# Patient Record
Sex: MALE | Race: WHITE | URBAN - METROPOLITAN AREA
[De-identification: names, ages, dates, MRNs, and addresses within clinical notes are randomized per-mention and may not be internally consistent; named-entity substitution may affect disease eponyms.]

---

## 2022-03-18 PROBLEM — M79.89 LEFT LEG SWELLING: Status: ACTIVE | Noted: 2022-01-31

## 2022-03-19 PROBLEM — E78.00 PURE HYPERCHOLESTEROLEMIA: Status: ACTIVE | Noted: 2022-01-31

## 2022-03-20 PROBLEM — I10 PRIMARY HYPERTENSION: Status: ACTIVE | Noted: 2022-01-31

## 2022-05-04 ENCOUNTER — APPOINTMENT (RX ONLY)
Dept: URBAN - METROPOLITAN AREA CLINIC 23 | Facility: CLINIC | Age: 52
Setting detail: DERMATOLOGY
End: 2022-05-04

## 2022-05-04 DIAGNOSIS — L55.9 SUNBURN, UNSPECIFIED: ICD-10-CM

## 2022-05-04 DIAGNOSIS — L30.9 DERMATITIS, UNSPECIFIED: ICD-10-CM

## 2022-05-04 PROCEDURE — ? BIOPSY BY SHAVE METHOD

## 2022-05-04 PROCEDURE — 99202 OFFICE O/P NEW SF 15 MIN: CPT | Mod: 25

## 2022-05-04 PROCEDURE — 11102 TANGNTL BX SKIN SINGLE LES: CPT

## 2022-05-04 PROCEDURE — ? COUNSELING

## 2022-05-04 ASSESSMENT — LOCATION SIMPLE DESCRIPTION DERM
LOCATION SIMPLE: RIGHT PRETIBIAL REGION
LOCATION SIMPLE: LEFT THIGH
LOCATION SIMPLE: LEFT PRETIBIAL REGION

## 2022-05-04 ASSESSMENT — LOCATION DETAILED DESCRIPTION DERM
LOCATION DETAILED: LEFT ANTERIOR DISTAL THIGH
LOCATION DETAILED: LEFT DISTAL PRETIBIAL REGION
LOCATION DETAILED: RIGHT PROXIMAL PRETIBIAL REGION
LOCATION DETAILED: RIGHT DISTAL PRETIBIAL REGION
LOCATION DETAILED: RIGHT LATERAL PROXIMAL PRETIBIAL REGION

## 2022-05-04 ASSESSMENT — LOCATION ZONE DERM: LOCATION ZONE: LEG

## 2022-05-04 NOTE — PROCEDURE: BIOPSY BY SHAVE METHOD
Hide Biopsy Depth?: No
Biopsy Method: double edge Personna blade
Electrodesiccation Text: The wound bed was treated with electrodesiccation after the biopsy was performed.
Consent: Verbal consent was obtained and risks were reviewed including but not limited to scarring, infection, bleeding, scabbing, incomplete removal, and allergy to anesthesia. AFSHIN Graves.
Was A Bandage Applied: Yes
Type Of Destruction Used: Curettage
Anesthesia Type: 1% lidocaine without epinephrine and a 1:10 solution of 8.4% sodium bicarbonate
Additional Anesthesia Volume In Cc (Will Not Render If 0): 0
Wound Care: Vaseline
Billing Type: Third-Party Bill
Electrodesiccation And Curettage Text: The wound bed was treated with electrodesiccation and curettage after the biopsy was performed.
Detail Level: Detailed
Biopsy Type: H and E
Silver Nitrate Text: The wound bed was treated with silver nitrate after the biopsy was performed.
Dressing: bandage
Information: Selecting Yes will display possible errors in your note based on the variables you have selected. This validation is only offered as a suggestion for you. PLEASE NOTE THAT THE VALIDATION TEXT WILL BE REMOVED WHEN YOU FINALIZE YOUR NOTE. IF YOU WANT TO FAX A PRELIMINARY NOTE YOU WILL NEED TO TOGGLE THIS TO 'NO' IF YOU DO NOT WANT IT IN YOUR FAXED NOTE.
Notification Instructions: Patient will be notified of biopsy results. However, patient instructed to call the office if not contacted within 2 weeks.
Accession #: [id number]
Cryotherapy Text: The wound bed was treated with cryotherapy after the biopsy was performed.
Hemostasis: Aluminum Chloride
Post-care instructions were reviewed in detail and written instructions are provided. Patient is to keep the biopsy site dry overnight, and then apply bacitracin twice daily until healed. Patient may apply hydrogen peroxide soaks to remove any crusting.
Anesthesia Volume In Cc: 0.5
Depth Of Biopsy: dermis

## 2022-05-13 ENCOUNTER — RX ONLY (OUTPATIENT)
Age: 52
Setting detail: RX ONLY
End: 2022-05-13

## 2022-05-13 RX ORDER — CLOBETASOL PROPIONATE 0.5 MG/G
CREAM TOPICAL
Qty: 45 | Refills: 1 | Status: ERX | COMMUNITY
Start: 2022-05-13

## 2022-07-20 ENCOUNTER — OFFICE VISIT (OUTPATIENT)
Dept: CARDIOLOGY CLINIC | Age: 52
End: 2022-07-20
Payer: COMMERCIAL

## 2022-07-20 VITALS
SYSTOLIC BLOOD PRESSURE: 120 MMHG | BODY MASS INDEX: 38.31 KG/M2 | HEIGHT: 72 IN | WEIGHT: 282.8 LBS | HEART RATE: 74 BPM | DIASTOLIC BLOOD PRESSURE: 70 MMHG

## 2022-07-20 DIAGNOSIS — M79.89 LEFT LEG SWELLING: ICD-10-CM

## 2022-07-20 DIAGNOSIS — I10 PRIMARY HYPERTENSION: Primary | ICD-10-CM

## 2022-07-20 DIAGNOSIS — E78.00 PURE HYPERCHOLESTEROLEMIA: ICD-10-CM

## 2022-07-20 PROCEDURE — 99214 OFFICE O/P EST MOD 30 MIN: CPT | Performed by: INTERNAL MEDICINE

## 2022-07-20 NOTE — PROGRESS NOTES
[address], [address] [employer] [address]  PHONE: [phone]     22    NAME:  Isa Conn  : 1970  MRN: [id number]       SUBJECTIVE:   Isa Conn is a 47 y.o. male seen for a follow up visit regarding the following:     Chief Complaint   Patient presents with    Hypertension     4 mo f/u        HPI:    Echo 3/2022:  Normal EF, no pHTN, normal RV    Edema better off the nifedipine, on aldactone and HCTZ. Better now. Feeling well now. No CP, pressure, THOMPSON. No new SOB. Patient denies recent history of orthopnea, PND, excessive dizziness and/or syncope. [facility] 2022: US showed No evidence of DVT within the left lower extremity      Past Medical History, Past Surgical History, Family history, Social History, and Medications were all reviewed with the patient today and updated as necessary. Current Outpatient Medications   Medication Sig Dispense Refill    hydroCHLOROthiazide (HYDRODIURIL) 25 MG tablet Take 25 mg by mouth daily      levothyroxine (SYNTHROID) 25 MCG tablet Take 25 mcg by mouth daily      pravastatin (PRAVACHOL) 20 MG tablet Take 20 mg by mouth daily      spironolactone (ALDACTONE) 25 MG tablet Take 25 mg by mouth daily      tadalafil (CIALIS) 5 MG tablet Take 5 mg by mouth as needed      tamsulosin (FLOMAX) 0.4 MG capsule Take 0.4 mg by mouth daily       No current facility-administered medications for this visit. No Known Allergies  Patient Active Problem List    Diagnosis Date Noted    Left leg swelling 2022    Pure hypercholesterolemia 2022    Primary hypertension 2022      No past surgical history on file.   Family History   Problem Relation Age of Onset    Stroke Paternal Grandfather     Stroke Paternal Grandmother     No Known Problems Father     No Known Problems Mother      Social History     Tobacco Use    Smoking status: Never    Smokeless tobacco: Current   Substance Use Topics    Alcohol use: Yes         ROS:    No obvious pertinent positives on review of systems except for what was outlined in the HPI today. PHYSICAL EXAM:     /70 Comment: home re-check  Pulse 74   Ht 6' (1.829 m)   Wt 282 lb 12.8 oz (128.3 kg)   BMI 38.35 kg/m²    General/Constitutional:   Alert and oriented x 3, no acute distress  HEENT:   normocephalic, atraumatic, moist mucous membranes  Neck:   No JVD or carotid bruits bilaterally  Cardiovascular:   regular rate and rhythm, no murmur/rub/gallop appreciated  Pulmonary:   clear to auscultation bilaterally, no respiratory distress  Abdomen:   soft, non-tender, non-distended  Ext:   No sig LE edema bilaterally  Skin:  warm and dry, no obvious rashes seen  Neuro:   no obvious sensory or motor deficits  Psychiatric:   normal mood and affect      Lab Results   Component Value Date/Time     03/24/2022 09:31 AM    K 3.8 03/24/2022 09:31 AM     03/24/2022 09:31 AM    CO2 23 03/24/2022 09:31 AM    BUN 10 03/24/2022 09:31 AM    CREATININE 1.10 03/24/2022 09:31 AM    GLUCOSE 118 03/24/2022 09:31 AM    CALCIUM 9.6 03/24/2022 09:31 AM        No results found for: WBC, HGB, HCT, MCV, PLT    Lab Results   Component Value Date    TSH 2.630 03/24/2022       No results found for: LABA1C  No results found for: EAG    No results found for: CHOL  No results found for: TRIG  No results found for: HDL  No results found for: LDLCHOLESTEROL, LDLCALC  No results found for: LABVLDL, VLDL  No results found for: CHOLHDLRATIO        I have Independently reviewed prior care notes, any ER records available, cardiac testing, labs and results with the patient and before seeing the patient today. Also independently reviewed outside records when available. ASSESSMENT:    Jong Pillai was seen today for hypertension. Diagnoses and all orders for this visit:    Primary hypertension    Left leg swelling    Pure hypercholesterolemia        PLAN:    1. LE edema/HTN:  No DVT on US. Echo ok.   Stopped the nifedipine, NOW on aldactone with HCTZ. Follow K and Cr. Getting labs next week. Edema better. BP better at home now. Low salt diet needed. Needs to elevate legs. 2. HPL:  follow, getting labs. Patient was encouraged to engage in regular moderate physical activity for at least 30-45 minutes at least 4-5 days of the week. We also reviewed heart healthy diets such as a whole foods plant based diet and a mediterranean diet rich in whole grains, fruits and vegetables. The importance of a healthy lifestyle was reviewed as well. The patient voiced understanding. The patient has been instructed to call with any angina or equivalent as reviewed today. All questions were answered with the patient voicing complete understanding. Patient has been instructed and agrees to call our office with any issues or other concerns related to their cardiac condition(s) and/or complaint(s). Return in about 6 months (around 1/20/2023).        MARYSOL FARNSWORTH, DO  7/20/2022

## 2023-01-19 RX ORDER — HYDROCHLOROTHIAZIDE 25 MG/1
25 TABLET ORAL DAILY
Qty: 90 TABLET | Refills: 3 | Status: SHIPPED | OUTPATIENT
Start: 2023-01-19

## 2023-01-19 NOTE — TELEPHONE ENCOUNTER
MEDICATION REFILL REQUEST      Name of Medication:  Hydrochlorothiazide   Dose:  25 mg  Frequency:    Quantity:    Days' supply:  90      [facility]Location[facility]

## 2023-01-25 ENCOUNTER — RX ONLY (OUTPATIENT)
Age: 53
Setting detail: RX ONLY
End: 2023-01-25

## 2023-01-25 RX ORDER — TRIAMCINOLONE ACETONIDE 1 MG/G
CREAM TOPICAL
Qty: 80 | Refills: 0 | Status: ERX | COMMUNITY
Start: 2023-01-25

## 2023-03-22 NOTE — TELEPHONE ENCOUNTER
MEDICATION REFILL REQUEST      Name of Medication:  Spironolactone  Dose:  25 mg  Frequency:  QD  Quantity:  ?  Days' supply:  ?       Ph[facility] Name/Location:  [facility] [phone]

## 2023-03-23 RX ORDER — SPIRONOLACTONE 25 MG/1
25 TABLET ORAL DAILY
Qty: 90 TABLET | Refills: 1 | Status: SHIPPED | OUTPATIENT
Start: 2023-03-23

## 2023-09-21 ENCOUNTER — TELEPHONE (OUTPATIENT)
Age: 53
End: 2023-09-21

## 2023-09-21 NOTE — TELEPHONE ENCOUNTER
MEDICATION REFILL REQUEST      Name of Medication:  Spironolactone   Dose:  25 mg  Frequency:  daily   Quantity:    Days' supply:  90      Pharm[facility] Name/Location:  [facility]

## 2023-09-22 RX ORDER — SPIRONOLACTONE 25 MG/1
25 TABLET ORAL DAILY
Qty: 30 TABLET | Refills: 0 | Status: SHIPPED | OUTPATIENT
Start: 2023-09-22

## 2023-09-22 NOTE — TELEPHONE ENCOUNTER
Requested Prescriptions     Signed Prescriptions Disp Refills    spironolactone (ALDACTONE) 25 MG tablet 30 tablet 0     Sig: Take 1 tablet by mouth daily     Authorizing Provider: Bautista Esquivel     Ordering User: Bryson Wallace

## 2023-10-23 RX ORDER — SPIRONOLACTONE 25 MG/1
25 TABLET ORAL DAILY
Qty: 30 TABLET | Refills: 0 | Status: SHIPPED | OUTPATIENT
Start: 2023-10-23

## 2023-10-23 NOTE — TELEPHONE ENCOUNTER
MEDICATION REFILL REQUEST      Name of Medication:  Spironolactone  Dose:  25 mg  Frequency:  1 a day  Quantity:  ?  Days' supply:  ?       Pharmacy Name/Location:  [facility] [phone]

## 2023-10-30 ENCOUNTER — OFFICE VISIT (OUTPATIENT)
Age: 53
End: 2023-10-30
Payer: COMMERCIAL

## 2023-10-30 VITALS
SYSTOLIC BLOOD PRESSURE: 156 MMHG | DIASTOLIC BLOOD PRESSURE: 100 MMHG | HEART RATE: 74 BPM | HEIGHT: 72 IN | WEIGHT: 292.6 LBS | BODY MASS INDEX: 39.63 KG/M2

## 2023-10-30 DIAGNOSIS — E78.00 PURE HYPERCHOLESTEROLEMIA: ICD-10-CM

## 2023-10-30 DIAGNOSIS — I10 PRIMARY HYPERTENSION: Primary | ICD-10-CM

## 2023-10-30 DIAGNOSIS — M25.60 JOINT STIFFNESS: ICD-10-CM

## 2023-10-30 PROCEDURE — 99214 OFFICE O/P EST MOD 30 MIN: CPT | Performed by: INTERNAL MEDICINE

## 2023-10-30 PROCEDURE — 3080F DIAST BP >= 90 MM HG: CPT | Performed by: INTERNAL MEDICINE

## 2023-10-30 PROCEDURE — 3077F SYST BP >= 140 MM HG: CPT | Performed by: INTERNAL MEDICINE

## 2023-10-30 PROCEDURE — 93000 ELECTROCARDIOGRAM COMPLETE: CPT | Performed by: INTERNAL MEDICINE

## 2023-10-30 RX ORDER — METOPROLOL TARTRATE 50 MG/1
50 TABLET, FILM COATED ORAL 2 TIMES DAILY
COMMUNITY
Start: 2023-04-28

## 2023-10-30 RX ORDER — LOSARTAN POTASSIUM 100 MG/1
100 TABLET ORAL DAILY
Qty: 90 TABLET | Refills: 3 | Status: SHIPPED | OUTPATIENT
Start: 2023-10-30

## 2023-11-20 DIAGNOSIS — E78.00 PURE HYPERCHOLESTEROLEMIA: ICD-10-CM

## 2023-11-20 DIAGNOSIS — M25.60 JOINT STIFFNESS: ICD-10-CM

## 2023-11-20 DIAGNOSIS — I10 PRIMARY HYPERTENSION: ICD-10-CM

## 2023-11-20 LAB
ALBUMIN SERPL-MCNC: 3.7 G/DL (ref 3.5–5)
ALBUMIN/GLOB SERPL: 1.1 (ref 0.4–1.6)
ALP SERPL-CCNC: 63 U/L (ref 50–136)
ALT SERPL-CCNC: 40 U/L (ref 12–65)
ANION GAP SERPL CALC-SCNC: 8 MMOL/L (ref 2–11)
AST SERPL-CCNC: 24 U/L (ref 15–37)
BILIRUB SERPL-MCNC: 0.4 MG/DL (ref 0.2–1.1)
BUN SERPL-MCNC: 13 MG/DL (ref 6–23)
CALCIUM SERPL-MCNC: 8.9 MG/DL (ref 8.3–10.4)
CHLORIDE SERPL-SCNC: 109 MMOL/L (ref 101–110)
CHOLEST SERPL-MCNC: 199 MG/DL
CO2 SERPL-SCNC: 22 MMOL/L (ref 21–32)
CREAT SERPL-MCNC: 1.1 MG/DL (ref 0.8–1.5)
ERYTHROCYTE [DISTWIDTH] IN BLOOD BY AUTOMATED COUNT: 13.2 % (ref 11.9–14.6)
GLOBULIN SER CALC-MCNC: 3.5 G/DL (ref 2.8–4.5)
GLUCOSE SERPL-MCNC: 126 MG/DL (ref 65–100)
HCT VFR BLD AUTO: 49.3 % (ref 41.1–50.3)
HDLC SERPL-MCNC: 65 MG/DL (ref 40–60)
HDLC SERPL: 3.1
HGB BLD-MCNC: 16.5 G/DL (ref 13.6–17.2)
LDLC SERPL CALC-MCNC: 103.2 MG/DL
MAGNESIUM SERPL-MCNC: 2.2 MG/DL (ref 1.8–2.4)
MCH RBC QN AUTO: 32.6 PG (ref 26.1–32.9)
MCHC RBC AUTO-ENTMCNC: 33.5 G/DL (ref 31.4–35)
MCV RBC AUTO: 97.4 FL (ref 82–102)
NRBC # BLD: 0 K/UL (ref 0–0.2)
PLATELET # BLD AUTO: 204 K/UL (ref 150–450)
PMV BLD AUTO: 10 FL (ref 9.4–12.3)
POTASSIUM SERPL-SCNC: 4.7 MMOL/L (ref 3.5–5.1)
PROT SERPL-MCNC: 7.2 G/DL (ref 6.3–8.2)
PSA SERPL-MCNC: 0.7 NG/ML
RBC # BLD AUTO: 5.06 M/UL (ref 4.23–5.6)
SODIUM SERPL-SCNC: 139 MMOL/L (ref 133–143)
TRIGL SERPL-MCNC: 154 MG/DL (ref 35–150)
TSH, 3RD GENERATION: 3.74 UIU/ML (ref 0.36–3.74)
VLDLC SERPL CALC-MCNC: 30.8 MG/DL (ref 6–23)
WBC # BLD AUTO: 6.6 K/UL (ref 4.3–11.1)

## 2023-11-21 LAB
ANA SER QL: NEGATIVE
EST. AVERAGE GLUCOSE BLD GHB EST-MCNC: 117 MG/DL
HBA1C MFR BLD: 5.7 % (ref 4.8–5.6)
RHEUMATOID FACT SER QL LA: NEGATIVE

## 2023-11-22 RX ORDER — SPIRONOLACTONE 25 MG/1
25 TABLET ORAL DAILY
Qty: 30 TABLET | Refills: 11 | Status: SHIPPED | OUTPATIENT
Start: 2023-11-22

## 2023-11-22 NOTE — TELEPHONE ENCOUNTER
MEDICATION REFILL REQUEST      Name of Medication:  Spironolactone  Dose:  25 mg  Frequency:  QD  Quantity:  30  Days' supply:  30 with refills      Pharmacy Name/Location: [facility]  Pt is out of records

## 2023-12-07 ENCOUNTER — OFFICE VISIT (OUTPATIENT)
Age: 53
End: 2023-12-07
Payer: COMMERCIAL

## 2023-12-07 VITALS
WEIGHT: 292 LBS | BODY MASS INDEX: 39.55 KG/M2 | SYSTOLIC BLOOD PRESSURE: 128 MMHG | HEIGHT: 72 IN | DIASTOLIC BLOOD PRESSURE: 86 MMHG | HEART RATE: 68 BPM

## 2023-12-07 DIAGNOSIS — I10 PRIMARY HYPERTENSION: ICD-10-CM

## 2023-12-07 DIAGNOSIS — E78.00 PURE HYPERCHOLESTEROLEMIA: Primary | ICD-10-CM

## 2023-12-07 DIAGNOSIS — M79.89 LEFT LEG SWELLING: ICD-10-CM

## 2023-12-07 PROCEDURE — 3079F DIAST BP 80-89 MM HG: CPT | Performed by: INTERNAL MEDICINE

## 2023-12-07 PROCEDURE — 99214 OFFICE O/P EST MOD 30 MIN: CPT | Performed by: INTERNAL MEDICINE

## 2023-12-07 PROCEDURE — 3074F SYST BP LT 130 MM HG: CPT | Performed by: INTERNAL MEDICINE

## 2024-10-29 RX ORDER — LOSARTAN POTASSIUM 100 MG/1
100 TABLET ORAL DAILY
Qty: 90 TABLET | Refills: 0 | Status: SHIPPED | OUTPATIENT
Start: 2024-10-29

## 2024-10-29 NOTE — TELEPHONE ENCOUNTER
MEDICATION REFILL REQUEST      Name of Medication:  Losartan  Dose:  100 mg  Frequency:  daily   Quantity:    Days' supply:  90 day       Pharm[facility] Name/Location:  [facility] [phone]

## 2024-11-13 RX ORDER — SPIRONOLACTONE 25 MG/1
25 TABLET ORAL DAILY
Qty: 90 TABLET | Refills: 0 | Status: SHIPPED | OUTPATIENT
Start: 2024-11-13

## 2024-11-13 NOTE — TELEPHONE ENCOUNTER
MEDICATION REFILL REQUEST      Name of Medication:  Spironolactone  Dose:  25 mg  Frequency:  QD  Quantity:  90  Days' supply:  90 with 3 refills      Pharmacy Name/Location:  [facility] [phone]

## 2024-12-17 ENCOUNTER — TELEPHONE (OUTPATIENT)
Age: 54
End: 2024-12-17

## 2024-12-17 NOTE — TELEPHONE ENCOUNTER
MEDICATION REFILL REQUEST      Name of Medication:  Spironolactone  Dose:  25 mg  Frequency:  QD  Quantity:  90  Days' supply:  90 with 3 refill      Pharmacy Name/Location:  [facility] [phone]

## 2024-12-17 NOTE — TELEPHONE ENCOUNTER
Called pt advised Rx was sent in a month ago for 3 months. Pt states should have enough but was needing refills. Advised would need to be seen in office. Scheduled for 2/14/25.

## 2025-01-28 RX ORDER — LOSARTAN POTASSIUM 100 MG/1
100 TABLET ORAL DAILY
Qty: 30 TABLET | Refills: 0 | Status: SHIPPED | OUTPATIENT
Start: 2025-01-28

## 2025-01-28 NOTE — TELEPHONE ENCOUNTER
Losartan refill HE IS OUT   [facility] in [place]     Coming in 02/14 to see Dr Solis

## 2025-02-14 ENCOUNTER — OFFICE VISIT (OUTPATIENT)
Age: 55
End: 2025-02-14
Payer: COMMERCIAL

## 2025-02-14 VITALS
SYSTOLIC BLOOD PRESSURE: 130 MMHG | DIASTOLIC BLOOD PRESSURE: 86 MMHG | HEART RATE: 74 BPM | BODY MASS INDEX: 42.39 KG/M2 | HEIGHT: 72 IN | WEIGHT: 313 LBS

## 2025-02-14 DIAGNOSIS — I10 PRIMARY HYPERTENSION: Primary | ICD-10-CM

## 2025-02-14 DIAGNOSIS — E78.00 PURE HYPERCHOLESTEROLEMIA: ICD-10-CM

## 2025-02-14 PROCEDURE — 93000 ELECTROCARDIOGRAM COMPLETE: CPT | Performed by: INTERNAL MEDICINE

## 2025-02-14 PROCEDURE — 99214 OFFICE O/P EST MOD 30 MIN: CPT | Performed by: INTERNAL MEDICINE

## 2025-02-14 PROCEDURE — 3079F DIAST BP 80-89 MM HG: CPT | Performed by: INTERNAL MEDICINE

## 2025-02-14 PROCEDURE — 3075F SYST BP GE 130 - 139MM HG: CPT | Performed by: INTERNAL MEDICINE

## 2025-02-14 RX ORDER — LOSARTAN POTASSIUM 100 MG/1
100 TABLET ORAL DAILY
Qty: 90 TABLET | Refills: 3 | Status: SHIPPED | OUTPATIENT
Start: 2025-02-14

## 2025-02-14 RX ORDER — SPIRONOLACTONE 25 MG/1
25 TABLET ORAL DAILY
Qty: 90 TABLET | Refills: 3 | Status: SHIPPED | OUTPATIENT
Start: 2025-02-14

## 2025-02-14 NOTE — PROGRESS NOTES
or other concerns related to their cardiac condition(s) and/or complaint(s).        Return in about 1 year (around 2/14/2026).       Wil Solis, DO  2/14/2025